# Patient Record
Sex: FEMALE | Race: AMERICAN INDIAN OR ALASKA NATIVE
[De-identification: names, ages, dates, MRNs, and addresses within clinical notes are randomized per-mention and may not be internally consistent; named-entity substitution may affect disease eponyms.]

---

## 2019-04-11 ENCOUNTER — HOSPITAL ENCOUNTER (EMERGENCY)
Dept: HOSPITAL 5 - ED | Age: 39
Discharge: HOME | End: 2019-04-11
Payer: SELF-PAY

## 2019-04-11 VITALS — SYSTOLIC BLOOD PRESSURE: 149 MMHG | DIASTOLIC BLOOD PRESSURE: 91 MMHG

## 2019-04-11 DIAGNOSIS — N39.0: Primary | ICD-10-CM

## 2019-04-11 DIAGNOSIS — Z98.51: ICD-10-CM

## 2019-04-11 LAB
ALBUMIN SERPL-MCNC: 4.1 G/DL (ref 3.9–5)
ALT SERPL-CCNC: 9 UNITS/L (ref 7–56)
BASOPHILS # (AUTO): 0 K/MM3 (ref 0–0.1)
BASOPHILS NFR BLD AUTO: 0.9 % (ref 0–1.8)
BILIRUB UR QL STRIP: (no result)
BLOOD UR QL VISUAL: (no result)
BUN SERPL-MCNC: 18 MG/DL (ref 7–17)
BUN/CREAT SERPL: 26 %
CALCIUM SERPL-MCNC: 9 MG/DL (ref 8.4–10.2)
EOSINOPHIL # BLD AUTO: 0 K/MM3 (ref 0–0.4)
EOSINOPHIL NFR BLD AUTO: 0.7 % (ref 0–4.3)
HCT VFR BLD CALC: 31.1 % (ref 30.3–42.9)
HEMOLYSIS INDEX: 12
HGB BLD-MCNC: 10.2 GM/DL (ref 10.1–14.3)
LYMPHOCYTES # BLD AUTO: 1.6 K/MM3 (ref 1.2–5.4)
LYMPHOCYTES NFR BLD AUTO: 32 % (ref 13.4–35)
MCHC RBC AUTO-ENTMCNC: 33 % (ref 30–34)
MCV RBC AUTO: 83 FL (ref 79–97)
MONOCYTES # (AUTO): 0.4 K/MM3 (ref 0–0.8)
MONOCYTES % (AUTO): 8.5 % (ref 0–7.3)
MUCOUS THREADS #/AREA URNS HPF: (no result) /HPF
PH UR STRIP: 5 [PH] (ref 5–7)
PLATELET # BLD: 367 K/MM3 (ref 140–440)
PROT UR STRIP-MCNC: (no result) MG/DL
RBC # BLD AUTO: 3.74 M/MM3 (ref 3.65–5.03)
RBC #/AREA URNS HPF: 1 /HPF (ref 0–6)
UROBILINOGEN UR-MCNC: < 2 MG/DL (ref ?–2)
WBC #/AREA URNS HPF: 2 /HPF (ref 0–6)

## 2019-04-11 PROCEDURE — 80053 COMPREHEN METABOLIC PANEL: CPT

## 2019-04-11 PROCEDURE — 81001 URINALYSIS AUTO W/SCOPE: CPT

## 2019-04-11 PROCEDURE — 74176 CT ABD & PELVIS W/O CONTRAST: CPT

## 2019-04-11 PROCEDURE — 36415 COLL VENOUS BLD VENIPUNCTURE: CPT

## 2019-04-11 PROCEDURE — 81025 URINE PREGNANCY TEST: CPT

## 2019-04-11 PROCEDURE — 85025 COMPLETE CBC W/AUTO DIFF WBC: CPT

## 2019-04-11 NOTE — EMERGENCY DEPARTMENT REPORT
ED Abdominal Pain HPI





- General


Chief Complaint: Abdominal Pain


Stated Complaint: ABD PAIN


Time Seen by Provider: 19 08:58


Source: patient


Mode of arrival: Ambulatory


Limitations: No Limitations





- History of Present Illness


Initial Comments: 





Patient is a 38-year-old female that presents emergency room with complaints of 

bilateral lower abdominal pain 4 days.  Patient states the abdominal pain is a 

5 out of 10 and is better with rest and worse with movement.  Patient denies 

nausea vomiting.  Patient denies vaginal discharge.  Patient denies vaginal 

bleeding.  Patient states that she's had ectopic pregnancies in the past.  

Patient states she's had a bilateral tubal ligation.  Patient states she is 

sexually active.  Patient's last menstrual period was one week ago.  Patient 

states she is a 


MD Complaint: abdominal pain


-: Sudden


Location: LLQ, RLQ


Migration to: no migration


Severity: moderate


Severity scale (0 -10): 5


Quality: cramping, aching


Consistency: constant


Improves With: rest


Worsens With: movement


Associated Symptoms: denies: nausea, vomiting, diarrhea, fever, chills, 

constipation, dysuria, hematemesis, hematochezia, melena, hematuria, anorexia, 

syncope





- Related Data


LMP (females 10-50): last week


                                  Previous Rx's











 Medication  Instructions  Recorded  Last Taken  Type


 


Azithromycin [Zithromax Z-CEDRIC] 250 mg PO DAILY #1 pkg 14 Unknown Rx


 


Loratadine [Claritin] 10 mg PO DAILY #30 tablet 14 Unknown Rx


 


Promethazine /Codeine 5 ml PO Q6H PRN #150 udc 14 Unknown Rx





[Phenergan/Codeine 6.25-10 mg/5 ml]    


 


predniSONE [Deltasone] 50 mg PO QDAY #5 tab 14 Unknown Rx


 


Ibuprofen 800 mg PO Q8HR PRN #20 tablet 19 Unknown Rx


 


Sulfamethoxazole/Trimethoprim 1 each PO BID 7 Days #14 tablet 19 Unknown 

Rx





[Bactrim DS TAB]    











                                    Allergies











Allergy/AdvReac Type Severity Reaction Status Date / Time


 


No Known Allergies Allergy   Verified 19 07:53














ED Review of Systems


ROS: 


Stated complaint: ABD PAIN


Other details as noted in HPI





Constitutional: denies: chills, fever


Eyes: denies: eye pain, eye discharge, vision change


ENT: denies: ear pain, throat pain


Respiratory: denies: cough, shortness of breath, wheezing


Cardiovascular: denies: chest pain, palpitations


Endocrine: no symptoms reported


Gastrointestinal: abdominal pain.  denies: nausea, diarrhea


Genitourinary: denies: urgency, dysuria, discharge


Musculoskeletal: denies: back pain, joint swelling, arthralgia


Skin: denies: rash, lesions


Neurological: denies: headache, weakness, paresthesias


Psychiatric: denies: anxiety, depression


Hematological/Lymphatic: denies: easy bleeding, easy bruising





ED Past Medical Hx





- Past Medical History


Previous Medical History?: No


Additional medical history: ectopic preg.





- Surgical History


Past Surgical History?: Yes


Additional Surgical History: ectopic pregnancy "both tubes removed"





- Family History


Family history: no significant





- Social History


Smoking Status: Never Smoker


Substance Use Type: Alcohol





- Medications


Home Medications: 


                                Home Medications











 Medication  Instructions  Recorded  Confirmed  Last Taken  Type


 


Azithromycin [Zithromax Z-CEDRIC] 250 mg PO DAILY #1 pkg 14  Unknown Rx


 


Loratadine [Claritin] 10 mg PO DAILY #30 tablet 14  Unknown Rx


 


Promethazine /Codeine 5 ml PO Q6H PRN #150 udc 14  Unknown Rx





[Phenergan/Codeine 6.25-10 mg/5 ml]     


 


predniSONE [Deltasone] 50 mg PO QDAY #5 tab 14  Unknown Rx


 


Ibuprofen 800 mg PO Q8HR PRN #20 tablet 19  Unknown Rx


 


Sulfamethoxazole/Trimethoprim 1 each PO BID 7 Days #14 tablet 19  Unknown 

Rx





[Bactrim DS TAB]     














ED Physical Exam





- General


Limitations: No Limitations


General appearance: alert, in no apparent distress





- Head


Head exam: Present: atraumatic, normocephalic





- Eye


Eye exam: Present: normal appearance





- ENT


ENT exam: Present: mucous membranes moist





- Neck


Neck exam: Present: normal inspection





- Respiratory


Respiratory exam: Present: normal lung sounds bilaterally.  Absent: respiratory 

distress





- Cardiovascular


Cardiovascular Exam: Present: regular rate, normal rhythm.  Absent: systolic m

urmur, diastolic murmur, rubs, gallop





- GI/Abdominal


GI/Abdominal exam: Present: soft, tenderness (zulma lower quad ttp), normal bowel 

sounds





- Extremities Exam


Extremities exam: Present: normal inspection





- Back Exam


Back exam: Present: normal inspection





- Neurological Exam


Neurological exam: Present: alert, oriented X3





- Psychiatric


Psychiatric exam: Present: normal affect, normal mood





- Skin


Skin exam: Present: warm, dry, intact, normal color.  Absent: rash





ED Course


                                   Vital Signs











  19





  08:12 09:05


 


Temperature 98.2 F 


 


Pulse Rate 85 


 


Respiratory 16 15





Rate  


 


Blood Pressure 149/91 


 


O2 Sat by Pulse 98 





Oximetry  














- Reevaluation(s)


Reevaluation #1: 


Discussed all results the patient.  Patient given discharge instructions.  

Patient stable for discharge.  Patient voiced understanding of all discharge 

instructions and results.


19 11:25











ED Medical Decision Making





- Lab Data


Result diagrams: 


                                 19 09:26





                                 19 09:26





- Radiology Data


Radiology results: report reviewed





PROCEDURE: CT ABDOMEN PELVIS WO CON 





 TECHNIQUE: Multiple contrast axial images were obtained from the lung bases to 

the pubic symphysis


 without administration of IV contrast. Reformatted sagittal and coronal images 

were available for 


 review. 





 HISTORY: abd pain 





 COMPARISONS: None. 





 FINDINGS: 





 Lower thorax: Normal. 





 Liver and biliary tree: Normal noncontrast appearance. 





 Gallbladder: Normal. No calcified gallstones. No pericholecystic fluid or 

gallbladder wall 


 thickening 





 Spleen: Normal noncontrast appearance. 





 Pancreas: Normal noncontrast appearance. 





 Adrenal glands: Normal noncontrast appearance. 





 Kidneys, ureters, and bladder: 2.1 cm low-density lesion in the inferior pole 

of the right kidney. 


 2.4 center low-density lesion in the superior pole of the right kidney No 

hydronephrosis. No renal 


 or ureteral calculi. 





 Bowel:No focal wall thickening. No evidence of obstruction. Normal appendix 

without surrounding 


 inflammatory change 





 Peritoneum:No significant lymphadenopathy. No free air or free fluid. 





 Pelvic organs:Normal. 





 Vasculature: Normal noncontrast appearance. 





 Abdominal wall: Normal. 





 Bones: Normal. 





 IMPRESSION: 





 No acute intra-abdominal pathology. 





 Low-density lesions in each kidney, likely representative of cysts. Recommend 

further evaluation 


 with nonemergent ultrasound. 





- Medical Decision Making





Patient is a 38-year-old female presents emergency with lower abdominal pain.  

Patient had a CT done and was negative for acute findings.  Patient's UA is 

mildly positive for UTI.  Patient will be treated with antibiotics.  Patient 

also had complaints of irregular periods and will be referred to an OB/GYN for 

further outpatient treatment.  Patient's labs unremarkable.  Patient is stable 

for discharge.





- Differential Diagnosis


dominant.  Ovarian cyst.  Irregular period.  UTI.


Critical care attestation.: 


If time is entered above; I have spent that time in minutes in the direct care 

of this critically ill patient, excluding procedure time.








ED Disposition


Clinical Impression: 


Abdominal pain


Qualifiers:


 Abdominal location: lower abdomen, unspecified Qualified Code(s): R10.30 - 

Lower abdominal pain, unspecified





UTI (urinary tract infection)


Qualifiers:


 Urinary tract infection type: acute cystitis Hematuria presence: with hematuria

Qualified Code(s): N30.01 - Acute cystitis with hematuria





Disposition:  TO HOME OR SELFCARE


Is pt being admited?: No


Does the pt Need Aspirin: No


Condition: Stable


Instructions:  Abdominal Pain (ED)


Additional Instructions: 


Patient to follow up with primary care in 2-3 days.  Patient to return to ER if 

condition worsens.  Patient's take meds as directed.  Patient to see OB/GYN for 

further evaluation and treatment within 2-3 days.  Patient to take Tylenol or 

ibuprofen when necessary for pain.  Patient to increase water


Prescriptions: 


Sulfamethoxazole/Trimethoprim [Bactrim DS TAB] 1 each PO BID 7 Days #14 tablet


Ibuprofen 800 mg PO Q8HR PRN #20 tablet


 PRN Reason: pain


Referrals: 


DARRICK BECK MD [Primary Care Provider] - 2-3 Days


Time of Disposition: 11:33

## 2019-04-11 NOTE — CAT SCAN REPORT
PROCEDURE: CT ABDOMEN PELVIS WO CON 

 

TECHNIQUE:  Multiple contrast axial images were obtained from the lung bases to the pubic symphysis w
ithout administration of IV contrast. Reformatted sagittal and coronal images were available for revi
ew. 

 

HISTORY: abd pain 

 

COMPARISONS: None.  

 

FINDINGS: 

 

Lower thorax: Normal. 

  

Liver and biliary tree: Normal noncontrast appearance. 

 

Gallbladder: Normal. No calcified gallstones. No pericholecystic fluid or gallbladder wall thickening
 

 

Spleen: Normal noncontrast appearance. 

 

Pancreas: Normal noncontrast appearance. 

 

Adrenal glands: Normal noncontrast appearance. 

 

Kidneys, ureters, and bladder: 2.1 cm low-density lesion in the inferior pole of the right kidney. 2.
4 center low-density lesion in the superior pole of the right kidney No hydronephrosis. No renal or u
reteral calculi.  

 

Bowel:No focal wall thickening. No evidence of obstruction. Normal appendix without surrounding infla
mmatory change 

 

Peritoneum:No significant lymphadenopathy.  No free air or free fluid.  

 

Pelvic organs:Normal.  

 

Vasculature: Normal noncontrast appearance. 

 

Abdominal wall: Normal. 

 

Bones: Normal. 

 

IMPRESSION: 

 

No acute intra-abdominal pathology. 

 

Low-density lesions in each kidney, likely representative of cysts. Recommend further evaluation with
 nonemergent ultrasound. 

 

This document is electronically signed by Verito Ruiz MD., April 11 2019 11:16:58 AM ET

## 2019-08-08 ENCOUNTER — HOSPITAL ENCOUNTER (EMERGENCY)
Dept: HOSPITAL 5 - ED | Age: 39
Discharge: HOME | End: 2019-08-08
Payer: COMMERCIAL

## 2019-08-08 VITALS — SYSTOLIC BLOOD PRESSURE: 149 MMHG | DIASTOLIC BLOOD PRESSURE: 75 MMHG

## 2019-08-08 DIAGNOSIS — R30.0: ICD-10-CM

## 2019-08-08 DIAGNOSIS — Z11.3: Primary | ICD-10-CM

## 2019-08-08 LAB
BILIRUB UR QL STRIP: (no result)
BLOOD UR QL VISUAL: (no result)
MUCOUS THREADS #/AREA URNS HPF: (no result) /HPF
PH UR STRIP: 5 [PH] (ref 5–7)
PROT UR STRIP-MCNC: (no result) MG/DL
RBC #/AREA URNS HPF: 1 /HPF (ref 0–6)
UROBILINOGEN UR-MCNC: < 2 MG/DL (ref ?–2)
WBC #/AREA URNS HPF: < 1 /HPF (ref 0–6)

## 2019-08-08 PROCEDURE — 81001 URINALYSIS AUTO W/SCOPE: CPT

## 2019-08-08 PROCEDURE — 81025 URINE PREGNANCY TEST: CPT

## 2019-08-08 PROCEDURE — 99283 EMERGENCY DEPT VISIT LOW MDM: CPT

## 2019-08-08 NOTE — EMERGENCY DEPARTMENT REPORT
Chief Complaint: Urogenital-Female


Stated Complaint: ABD PAIN


Time Seen by Provider: 08/08/19 12:02





- HPI


History of Present Illness: 





This 38-year-old female presents to ED complaining of burning with urination 

testing going on intermittently for the past 5 days.  Patient denies abdominal 

pain, pelvic pain, fever, nausea, vomiting, diarrhea.  Patient also states that 

she wanted to get STD screening.





- ROS


Review of Systems: 





Denies all symptoms, as noted in HPI





- Exam


Vital Signs: 


                                   Vital Signs











  08/08/19





  12:02


 


Temperature 98.7 F


 


Pulse Rate 87


 


Respiratory 16





Rate 


 


Blood Pressure 149/75


 


O2 Sat by Pulse 99





Oximetry 











Physical Exam: 





General: Patient is alert and oriented 3 she is in no acute distress.  


abdomen: Nontender to palpation,


MSE screening note: 


Focused history and physical exam performed.


Due to findings the following was ordered:











ED Medical Decision Making





- Medical Decision Making





38-year-old female presents presents for STD screening


ED course: Urinalysis was completed and pregnancy test completed.  Both 

negative.


I discussed this findings with the patient.


I discussed the patient and she is worried about STD she is to follow-up with 

outside medical clinic.  I discussed the patient that this is not a medical 

emergency and she will need to follow-up with outside medical clinic to be 

tested


Discussed patient partner knowledge and treatment.


Discussed the follow-up with the health department for further STD testing.


Patient's alert and oriented times 3. Vital signs are normal patient is in no 

acute  discharge.


Patient will be discharged home with instructions.





ED Disposition for MSE


Clinical Impression: 


 Screen for STD (sexually transmitted disease), Dysuria





Disposition: DC-01 TO HOME OR SELFCARE


Is pt being admited?: No


Does the pt Need Aspirin: No


Condition: Stable


Instructions:  Dysuria (ED)


Additional Instructions: 


Make sure to follow up with [facility] medical clinic as discussed.





If you have any worsening symptoms or develop new symptoms please return to ED 

immediately.


Referrals: 


[facility] [Outside] - 3-5 Days


[facility] [Outside] - 3-5 Days


[facility] [Outside] - 3-5 Days


Forms:  Work/School Release Form(ED)


Time of Disposition: 13:04

## 2019-08-08 NOTE — EMERGENCY DEPARTMENT REPORT
Blank Doc





- Documentation


Documentation: 





This is a 38-year-old female that presents with dysuria and vaginal discharge.





This initial assessment/diagnostic orders/clinical plan/treatment(s) is/are 

subject to change based on patient's health status, clinical progression and re-

assessment by fellow clinical providers in the ED.  Further treatment and workup

at subsequent clinical providers discretion.  Patient/guardians urged not to 

elope from the ED as their condition may be serious if not clinically assessed 

and managed.  Initial orders include:


1- Patient sent to [facility] for further evaluation and treatment


2- UA


4- wet prep/GC

## 2019-08-18 ENCOUNTER — HOSPITAL ENCOUNTER (EMERGENCY)
Dept: HOSPITAL 5 - ED | Age: 39
Discharge: HOME | End: 2019-08-18
Payer: COMMERCIAL

## 2019-08-18 VITALS — DIASTOLIC BLOOD PRESSURE: 93 MMHG | SYSTOLIC BLOOD PRESSURE: 157 MMHG

## 2019-08-18 DIAGNOSIS — K21.9: Primary | ICD-10-CM

## 2019-08-18 PROCEDURE — 99282 EMERGENCY DEPT VISIT SF MDM: CPT

## 2019-08-18 NOTE — EVENT NOTE
ED Screening Note


Date of service: 08/18/19


Time: 11:50


ED Screening Note: 


39 y/o female comes in for sorethroat since Friday after eating a hotdog.  

Reports that she has a history acid reflux but has not taking any medication. 





This initial assessment/diagnostic orders/clinical plan/treatment(s) is/are 

subject to change based on patients health status, clinical progression and re-

assessment by fellow clinical providers in the ED. Further treatment and workup 

at subsequent clinical providers discretion. Patient/guardian urged not to elope

from the ED as their condition may be serious if not clinically assessed and 

managed. 





Initial orders include:

## 2022-06-21 ENCOUNTER — HOSPITAL ENCOUNTER (EMERGENCY)
Dept: HOSPITAL 5 - ED | Age: 42
Discharge: LEFT BEFORE DISCHARGE | End: 2022-06-21
Payer: SELF-PAY

## 2022-06-21 VITALS — DIASTOLIC BLOOD PRESSURE: 82 MMHG | SYSTOLIC BLOOD PRESSURE: 142 MMHG

## 2022-06-21 DIAGNOSIS — R07.89: Primary | ICD-10-CM

## 2022-06-21 DIAGNOSIS — Z53.21: ICD-10-CM

## 2023-01-26 ENCOUNTER — OFFICE VISIT (OUTPATIENT)
Dept: URBAN - METROPOLITAN AREA CLINIC 118 | Facility: CLINIC | Age: 43
End: 2023-01-26

## 2025-04-29 NOTE — EMERGENCY DEPARTMENT REPORT
ED ENT HPI





- General


Chief complaint: Sore Throat


Stated complaint: ACID REFLUX


Time Seen by Provider: 08/18/19 11:46


Source: patient


Mode of arrival: Ambulatory


Limitations: No Limitations





- History of Present Illness


Initial comments: 





39 y/o female comes in for sorethroat since Friday after eating a hotdog.  

Reports that she has a history acid reflux but has not taking any medication. 





MD complaint: sore throat


Onset/Timing: 3


-: days(s)


Location: throat


Quality: burning


Consistency: intermittent


Worsens with: none


Associated Symptoms: sore throat, other (reflux)





- Related Data


                                  Previous Rx's











 Medication  Instructions  Recorded  Last Taken  Type


 


Azithromycin [Zithromax Z-CEDRIC] 250 mg PO DAILY #1 pkg 12/27/14 Unknown Rx


 


Loratadine [Claritin] 10 mg PO DAILY #30 tablet 12/27/14 Unknown Rx


 


Promethazine /Codeine 5 ml PO Q6H PRN #150 udc 12/27/14 Unknown Rx





[Phenergan/Codeine 6.25-10 mg/5 ml]    


 


predniSONE [Deltasone] 50 mg PO QDAY #5 tab 12/27/14 Unknown Rx


 


Fluconazole [Diflucan] 150 mg PO DAILY 1 Days #1 tablet 04/11/19 Unknown Rx


 


Ibuprofen [Ibuprofen 800] 800 mg PO Q8HR PRN #20 tablet 04/11/19 Unknown Rx


 


Sulfamethoxazole/Trimethoprim 1 each PO BID 7 Days #14 tablet 04/11/19 Unknown 

Rx





[Bactrim DS TAB]    


 


Ibuprofen [Motrin 600 MG tab] 600 mg PO Q8H PRN #15 tablet 08/18/19 Unknown Rx


 


raNITIdine HCl [Zantac] 150 mg PO BID #30 tablet 08/18/19 Unknown Rx











                                    Allergies











Allergy/AdvReac Type Severity Reaction Status Date / Time


 


No Known Allergies Allergy   Verified 08/08/19 12:00














ED Dental HPI





- General


Chief complaint: Sore Throat


Stated complaint: ACID REFLUX


Time Seen by Provider: 08/18/19 11:46


Source: patient


Mode of arrival: Ambulatory


Limitations: No Limitations





- Related Data


                                  Previous Rx's











 Medication  Instructions  Recorded  Last Taken  Type


 


Azithromycin [Zithromax Z-CEDRIC] 250 mg PO DAILY #1 pkg 12/27/14 Unknown Rx


 


Loratadine [Claritin] 10 mg PO DAILY #30 tablet 12/27/14 Unknown Rx


 


Promethazine /Codeine 5 ml PO Q6H PRN #150 udc 12/27/14 Unknown Rx





[Phenergan/Codeine 6.25-10 mg/5 ml]    


 


predniSONE [Deltasone] 50 mg PO QDAY #5 tab 12/27/14 Unknown Rx


 


Fluconazole [Diflucan] 150 mg PO DAILY 1 Days #1 tablet 04/11/19 Unknown Rx


 


Ibuprofen [Ibuprofen 800] 800 mg PO Q8HR PRN #20 tablet 04/11/19 Unknown Rx


 


Sulfamethoxazole/Trimethoprim 1 each PO BID 7 Days #14 tablet 04/11/19 Unknown 

Rx





[Bactrim DS TAB]    


 


Ibuprofen [Motrin 600 MG tab] 600 mg PO Q8H PRN #15 tablet 08/18/19 Unknown Rx


 


raNITIdine HCl [Zantac] 150 mg PO BID #30 tablet 08/18/19 Unknown Rx











                                    Allergies











Allergy/AdvReac Type Severity Reaction Status Date / Time


 


No Known Allergies Allergy   Verified 08/08/19 12:00














ED Review of Systems


ROS: 


Stated complaint: ACID REFLUX


Other details as noted in HPI





Comment: All other systems reviewed and negative


Constitutional: denies: chills, fever


Eyes: denies: eye pain, eye discharge, vision change


ENT: denies: ear pain, throat pain


Respiratory: denies: cough, shortness of breath, wheezing


Cardiovascular: denies: chest pain, palpitations


Endocrine: no symptoms reported


Gastrointestinal: denies: abdominal pain, nausea, diarrhea


Genitourinary: denies: urgency, dysuria, discharge


Musculoskeletal: denies: back pain, joint swelling, arthralgia


Skin: denies: rash, lesions


Neurological: denies: headache, weakness, paresthesias


Psychiatric: denies: anxiety, depression


Hematological/Lymphatic: denies: easy bleeding, easy bruising





ED Past Medical Hx





- Past Medical History


Hx GERD: Yes


Additional medical history: ectopic preg.





- Surgical History


Additional Surgical History: ectopic pregnancy "both tubes removed"





- Social History


Smoking Status: Never Smoker


Substance Use Type: Alcohol





- Medications


Home Medications: 


                                Home Medications











 Medication  Instructions  Recorded  Confirmed  Last Taken  Type


 


Azithromycin [Zithromax Z-CEDRIC] 250 mg PO DAILY #1 pkg 12/27/14  Unknown Rx


 


Loratadine [Claritin] 10 mg PO DAILY #30 tablet 12/27/14  Unknown Rx


 


Promethazine /Codeine 5 ml PO Q6H PRN #150 udc 12/27/14  Unknown Rx





[Phenergan/Codeine 6.25-10 mg/5 ml]     


 


predniSONE [Deltasone] 50 mg PO QDAY #5 tab 12/27/14  Unknown Rx


 


Fluconazole [Diflucan] 150 mg PO DAILY 1 Days #1 tablet 04/11/19  Unknown Rx


 


Ibuprofen [Ibuprofen 800] 800 mg PO Q8HR PRN #20 tablet 04/11/19  Unknown Rx


 


Sulfamethoxazole/Trimethoprim 1 each PO BID 7 Days #14 tablet 04/11/19  Unknown 

Rx





[Bactrim DS TAB]     


 


Ibuprofen [Motrin 600 MG tab] 600 mg PO Q8H PRN #15 tablet 08/18/19  Unknown Rx


 


raNITIdine HCl [Zantac] 150 mg PO BID #30 tablet 08/18/19  Unknown Rx














ED Physical Exam





- General


Limitations: No Limitations


General appearance: alert, in no apparent distress





- Head


Head exam: Present: atraumatic, normocephalic





- Eye


Eye exam: Present: normal appearance





- ENT


ENT exam: Present: mucous membranes moist





- Neck


Neck exam: Present: normal inspection





- Respiratory


Respiratory exam: Present: normal lung sounds bilaterally.  Absent: respiratory 

distress





- Cardiovascular


Cardiovascular Exam: Present: regular rate, normal rhythm.  Absent: systolic 

murmur, diastolic murmur, rubs, gallop





- GI/Abdominal


GI/Abdominal exam: Present: soft, normal bowel sounds





- Extremities Exam


Extremities exam: Present: normal inspection





- Back Exam


Back exam: Present: normal inspection





- Neurological Exam


Neurological exam: Present: alert, oriented X3





- Psychiatric


Psychiatric exam: Present: normal affect, normal mood





- Skin


Skin exam: Present: warm, dry, intact, normal color.  Absent: rash





ED Course





                                   Vital Signs











  08/18/19





  11:47


 


Temperature 99.1 F


 


Pulse Rate 71


 


Respiratory 18





Rate 


 


Blood Pressure 157/93


 


O2 Sat by Pulse 100





Oximetry 














ED Medical Decision Making





- Medical Decision Making








39 y/o female comes in for sorethroat since Friday after eating a hotdog.  

Reports that she has a history acid reflux but has not taking any medication. 

Discharge on Zantac 150mg bid and prn Ibuprofen.  Discuss. 





Critical care attestation.: 


If time is entered above; I have spent that time in minutes in the direct care 

of this critically ill patient, excluding procedure time.








ED Disposition


Clinical Impression: 


 Acid reflux, Sore throat





Disposition: DC-01 TO HOME OR SELFCARE


Is pt being admited?: No


Does the pt Need Aspirin: No


Condition: Stable


Instructions:  Gastroesophageal Reflux Disease (ED)


Additional Instructions: 


Take medication as prescribed follow up with a GI specialist. 


Prescriptions: 


Ibuprofen [Motrin 600 MG tab] 600 mg PO Q8H PRN #15 tablet


 PRN Reason: Pain


raNITIdine HCl [Zantac] 150 mg PO BID #30 tablet


Referrals: 


DARRICK BECK MD [Primary Care Provider] - 3-5 Days


[facility] [Provider Group] - 3-5 Days


Forms:  Work/School Release Form(ED) 29-Apr-2025 14:03